# Patient Record
Sex: FEMALE | Race: WHITE | NOT HISPANIC OR LATINO | Employment: STUDENT | URBAN - METROPOLITAN AREA
[De-identification: names, ages, dates, MRNs, and addresses within clinical notes are randomized per-mention and may not be internally consistent; named-entity substitution may affect disease eponyms.]

---

## 2023-06-11 ENCOUNTER — APPOINTMENT (OUTPATIENT)
Dept: ULTRASOUND IMAGING | Facility: CLINIC | Age: 23
End: 2023-06-11
Attending: EMERGENCY MEDICINE
Payer: COMMERCIAL

## 2023-06-11 ENCOUNTER — HOSPITAL ENCOUNTER (EMERGENCY)
Facility: CLINIC | Age: 23
Discharge: HOME | End: 2023-06-12
Attending: EMERGENCY MEDICINE | Admitting: EMERGENCY MEDICINE
Payer: COMMERCIAL

## 2023-06-11 DIAGNOSIS — N83.291 COMPLEX CYST OF RIGHT OVARY: ICD-10-CM

## 2023-06-11 LAB
ALBUMIN SERPL BCG-MCNC: 4.3 G/DL (ref 3.5–5.2)
ALBUMIN UR-MCNC: NEGATIVE MG/DL
ALP SERPL-CCNC: 35 U/L (ref 35–104)
ALT SERPL W P-5'-P-CCNC: 26 U/L (ref 10–35)
ANION GAP SERPL CALCULATED.3IONS-SCNC: 8 MMOL/L (ref 7–15)
APPEARANCE UR: CLEAR
AST SERPL W P-5'-P-CCNC: 57 U/L (ref 10–35)
BASOPHILS # BLD AUTO: 0.1 10E3/UL (ref 0–0.2)
BASOPHILS NFR BLD AUTO: 1 %
BILIRUB SERPL-MCNC: 0.5 MG/DL
BILIRUB UR QL STRIP: NEGATIVE
BUN SERPL-MCNC: 13.5 MG/DL (ref 6–20)
CALCIUM SERPL-MCNC: 9 MG/DL (ref 8.6–10)
CHLORIDE SERPL-SCNC: 101 MMOL/L (ref 98–107)
COLOR UR AUTO: ABNORMAL
CREAT SERPL-MCNC: 0.81 MG/DL (ref 0.51–0.95)
DEPRECATED HCO3 PLAS-SCNC: 26 MMOL/L (ref 22–29)
EOSINOPHIL # BLD AUTO: 0.2 10E3/UL (ref 0–0.7)
EOSINOPHIL NFR BLD AUTO: 2 %
ERYTHROCYTE [DISTWIDTH] IN BLOOD BY AUTOMATED COUNT: 13 % (ref 10–15)
GFR SERPL CREATININE-BSD FRML MDRD: >90 ML/MIN/1.73M2
GLUCOSE SERPL-MCNC: 92 MG/DL (ref 70–99)
GLUCOSE UR STRIP-MCNC: NEGATIVE MG/DL
HCG UR QL: NEGATIVE
HCT VFR BLD AUTO: 36.7 % (ref 35–47)
HGB BLD-MCNC: 12.1 G/DL (ref 11.7–15.7)
HGB UR QL STRIP: NEGATIVE
IMM GRANULOCYTES # BLD: 0 10E3/UL
IMM GRANULOCYTES NFR BLD: 0 %
KETONES UR STRIP-MCNC: NEGATIVE MG/DL
LEUKOCYTE ESTERASE UR QL STRIP: ABNORMAL
LYMPHOCYTES # BLD AUTO: 2.2 10E3/UL (ref 0.8–5.3)
LYMPHOCYTES NFR BLD AUTO: 21 %
MCH RBC QN AUTO: 29.2 PG (ref 26.5–33)
MCHC RBC AUTO-ENTMCNC: 33 G/DL (ref 31.5–36.5)
MCV RBC AUTO: 88 FL (ref 78–100)
MONOCYTES # BLD AUTO: 1 10E3/UL (ref 0–1.3)
MONOCYTES NFR BLD AUTO: 9 %
NEUTROPHILS # BLD AUTO: 7.1 10E3/UL (ref 1.6–8.3)
NEUTROPHILS NFR BLD AUTO: 67 %
NITRATE UR QL: NEGATIVE
NRBC # BLD AUTO: 0 10E3/UL
NRBC BLD AUTO-RTO: 0 /100
PH UR STRIP: 6.5 [PH] (ref 5–7)
PLATELET # BLD AUTO: 268 10E3/UL (ref 150–450)
POTASSIUM SERPL-SCNC: 3.5 MMOL/L (ref 3.4–5.3)
PROT SERPL-MCNC: 7 G/DL (ref 6.4–8.3)
RBC # BLD AUTO: 4.15 10E6/UL (ref 3.8–5.2)
RBC URINE: <1 /HPF
SODIUM SERPL-SCNC: 135 MMOL/L (ref 136–145)
SP GR UR STRIP: 1.01 (ref 1–1.03)
SQUAMOUS EPITHELIAL: 2 /HPF
UROBILINOGEN UR STRIP-MCNC: NORMAL MG/DL
WBC # BLD AUTO: 10.5 10E3/UL (ref 4–11)
WBC URINE: 2 /HPF

## 2023-06-11 PROCEDURE — 80053 COMPREHEN METABOLIC PANEL: CPT | Performed by: EMERGENCY MEDICINE

## 2023-06-11 PROCEDURE — 99284 EMERGENCY DEPT VISIT MOD MDM: CPT | Mod: 25 | Performed by: EMERGENCY MEDICINE

## 2023-06-11 PROCEDURE — 76856 US EXAM PELVIC COMPLETE: CPT

## 2023-06-11 PROCEDURE — 36415 COLL VENOUS BLD VENIPUNCTURE: CPT | Performed by: EMERGENCY MEDICINE

## 2023-06-11 PROCEDURE — 76705 ECHO EXAM OF ABDOMEN: CPT

## 2023-06-11 PROCEDURE — 81003 URINALYSIS AUTO W/O SCOPE: CPT | Performed by: EMERGENCY MEDICINE

## 2023-06-11 PROCEDURE — 85025 COMPLETE CBC W/AUTO DIFF WBC: CPT | Performed by: EMERGENCY MEDICINE

## 2023-06-11 PROCEDURE — 81025 URINE PREGNANCY TEST: CPT | Performed by: EMERGENCY MEDICINE

## 2023-06-11 PROCEDURE — 99284 EMERGENCY DEPT VISIT MOD MDM: CPT | Performed by: EMERGENCY MEDICINE

## 2023-06-11 ASSESSMENT — ACTIVITIES OF DAILY LIVING (ADL): ADLS_ACUITY_SCORE: 35

## 2023-06-12 VITALS
TEMPERATURE: 98.3 F | RESPIRATION RATE: 18 BRPM | BODY MASS INDEX: 25.01 KG/M2 | DIASTOLIC BLOOD PRESSURE: 73 MMHG | HEART RATE: 72 BPM | OXYGEN SATURATION: 97 % | SYSTOLIC BLOOD PRESSURE: 100 MMHG | HEIGHT: 68 IN | WEIGHT: 165 LBS

## 2023-06-12 DIAGNOSIS — R10.2 PELVIC PAIN IN FEMALE: Primary | ICD-10-CM

## 2023-06-12 PROCEDURE — 99203 OFFICE O/P NEW LOW 30 MIN: CPT | Performed by: OBSTETRICS & GYNECOLOGY

## 2023-06-12 ASSESSMENT — ACTIVITIES OF DAILY LIVING (ADL): ADLS_ACUITY_SCORE: 35

## 2023-06-12 NOTE — ED TRIAGE NOTES
Patient states to have right lower abdominal pain that has been going on for the last three days. Today at work it escalated, specifically while standing. States she is not having her period nor having constipation. She states to have taken some ibuprofen that helps a lot. States the pain is coming and going, but there is a constant discomfort.      Triage Assessment     Row Name 06/11/23 0696       Triage Assessment (Adult)    Airway WDL WDL       Respiratory WDL    Respiratory WDL WDL       Skin Circulation/Temperature WDL    Skin Circulation/Temperature WDL WDL       Cardiac WDL    Cardiac WDL WDL       Peripheral/Neurovascular WDL    Peripheral Neurovascular WDL WDL       Cognitive/Neuro/Behavioral WDL    Cognitive/Neuro/Behavioral WDL WDL

## 2023-06-12 NOTE — CONSULTS
"OBGYN CONSULT    HPI: 22 year old G0 presenting to ED tonight with RLQ pain. Has noted pain over past 3 days, worse today while working as a . Pain was constant associated with mild nausea. Better once she sat down. Took ibuprofen which helped. Bowels have been off with constipation and diarrhea. Denies fever or chills. LMP 5/18, is not on contraception and not sexually active. Denies vaginal concerns.     OBGYNHx: Regular, monthly menses, mild cramping.   No history of vaginal infections, never sexually active.     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 Lives with roommates, student at [employer].   Meds: Spironolactone for acne  All: NKDA  ROS: Otherwise negative    O: /73   Pulse 72   Temp 98.3  F (36.8  C) (Oral)   Resp 18   Ht 1.727 m (5' 8\")   Wt 74.8 kg (165 lb)   SpO2 97%   BMI 25.09 kg/m    General: Well appearing, sitting up in bed  Lungs: Non-labored breathing  Heart: Regular rate, well perfused  Abdomen: Soft, mild tenderness to palpation RLQ, no rebound or guarding  Extremities: Non-tender, no edema    CBC RESULTS: Recent Labs   Lab Test 06/11/23  2248   WBC 10.5   RBC 4.15   HGB 12.1   HCT 36.7   MCV 88   MCH 29.2   MCHC 33.0   RDW 13.0        UPT negative  US: Heterogeneous complex 4.8 cm right ovarian cyst. This is favored to represent a hemorrhagic ovarian cyst; however given its appearance, recommend follow-up ultrasound in 8-10 weeks to monitor for resolution. Follow up sooner if symptoms indicate.    A/P: 22 year old G0 with RLQ pain, hemorrhagic appearing cyst on US- no evidence of torsion.  Patient with benign exam and minimal pain currently. Recommend follow up US and visit in 6 weeks weeks. Does not have provider in [facility], will have our clinic reach out to schedule.   Ovarian cysts reviewed in detail, prevention and resolution. Torsion precautions reviewed in detail.  Ibuprofen for " pain  Offered letter for job as - will reach out if she needs.   All questions answered.     Nalini Cross MD

## 2023-06-12 NOTE — ED NOTES
"     Emergency Department Patient Sign-out       Brief HPI:  This is a 22 year old female signed out to me by Dr. Hector .  See initial ED Provider note for details of the presentation.            Significant Events prior to my assuming care: Pt with R sided pelvic pain intermittent. Has R cyst. OB to see for ? torsion      Exam:   Patient Vitals for the past 24 hrs:   BP Temp Temp src Pulse Resp SpO2 Height Weight   06/11/23 2218 110/72 98.2  F (36.8  C) Oral 68 18 100 % 1.727 m (5' 8\") 74.8 kg (165 lb)           ED RESULTS:   Results for orders placed or performed during the hospital encounter of 06/11/23 (from the past 24 hour(s))   UA with Microscopic reflex to Culture     Status: Abnormal    Collection Time: 06/11/23 10:45 PM    Specimen: Urine, Midstream   Result Value Ref Range    Color Urine Straw Colorless, Straw, Light Yellow, Yellow    Appearance Urine Clear Clear    Glucose Urine Negative Negative mg/dL    Bilirubin Urine Negative Negative    Ketones Urine Negative Negative mg/dL    Specific Gravity Urine 1.010 1.003 - 1.035    Blood Urine Negative Negative    pH Urine 6.5 5.0 - 7.0    Protein Albumin Urine Negative Negative mg/dL    Urobilinogen Urine Normal Normal, 2.0 mg/dL    Nitrite Urine Negative Negative    Leukocyte Esterase Urine Trace (A) Negative    RBC Urine <1 <=2 /HPF    WBC Urine 2 <=5 /HPF    Squamous Epithelials Urine 2 (H) <=1 /HPF    Narrative    Urine Culture not indicated   HCG qualitative urine (UPT)     Status: Normal    Collection Time: 06/11/23 10:45 PM   Result Value Ref Range    hCG Urine Qualitative Negative Negative   CBC with platelets differential     Status: None    Collection Time: 06/11/23 10:48 PM    Narrative    The following orders were created for panel order CBC with platelets differential.  Procedure                               Abnormality         Status                     ---------                               -----------         ------                     CBC " with platelets and d...[[id number]]                      Final result                 Please view results for these tests on the individual orders.   Comprehensive metabolic panel     Status: Abnormal    Collection Time: 06/11/23 10:48 PM   Result Value Ref Range    Sodium 135 (L) 136 - 145 mmol/L    Potassium 3.5 3.4 - 5.3 mmol/L    Chloride 101 98 - 107 mmol/L    Carbon Dioxide (CO2) 26 22 - 29 mmol/L    Anion Gap 8 7 - 15 mmol/L    Urea Nitrogen 13.5 6.0 - 20.0 mg/dL    Creatinine 0.81 0.51 - 0.95 mg/dL    Calcium 9.0 8.6 - 10.0 mg/dL    Glucose 92 70 - 99 mg/dL    Alkaline Phosphatase 35 35 - 104 U/L    AST 57 (H) 10 - 35 U/L    ALT 26 10 - 35 U/L    Protein Total 7.0 6.4 - 8.3 g/dL    Albumin 4.3 3.5 - 5.2 g/dL    Bilirubin Total 0.5 <=1.2 mg/dL    GFR Estimate >90 >60 mL/min/1.73m2   CBC with platelets and differential     Status: None    Collection Time: 06/11/23 10:48 PM   Result Value Ref Range    WBC Count 10.5 4.0 - 11.0 10e3/uL    RBC Count 4.15 3.80 - 5.20 10e6/uL    Hemoglobin 12.1 11.7 - 15.7 g/dL    Hematocrit 36.7 35.0 - 47.0 %    MCV 88 78 - 100 fL    MCH 29.2 26.5 - 33.0 pg    MCHC 33.0 31.5 - 36.5 g/dL    RDW 13.0 10.0 - 15.0 %    Platelet Count 268 150 - 450 10e3/uL    % Neutrophils 67 %    % Lymphocytes 21 %    % Monocytes 9 %    % Eosinophils 2 %    % Basophils 1 %    % Immature Granulocytes 0 %    NRBCs per 100 WBC 0 <1 /100    Absolute Neutrophils 7.1 1.6 - 8.3 10e3/uL    Absolute Lymphocytes 2.2 0.8 - 5.3 10e3/uL    Absolute Monocytes 1.0 0.0 - 1.3 10e3/uL    Absolute Eosinophils 0.2 0.0 - 0.7 10e3/uL    Absolute Basophils 0.1 0.0 - 0.2 10e3/uL    Absolute Immature Granulocytes 0.0 <=0.4 10e3/uL    Absolute NRBCs 0.0 10e3/uL   US Appendix Only     Status: None    Collection Time: 06/12/23 12:12 AM    Narrative    EXAM: US APPENDIX ONLY  LOCATION: [facility]  DATE/TIME: 6/12/2023 12:12 AM CDT    INDICATION: RLQ pain  COMPARISON: None.  TECHNIQUE:  Graded compression sonography of the right lower quadrant.    FINDINGS: The appendix is not visualized. No free fluid is visualized.      Impression    IMPRESSION:  1.  The appendix is not visualized. No free fluid is seen. This does not exclude acute appendicitis.       US Pelvis Cmplt w Transvag & Doppler LmtPel Duplex Limited     Status: None    Collection Time: 06/12/23 12:15 AM    Narrative    EXAM: US PELVIS COMPLETE W TRANSVAGINAL AND DOPPLER LIMITED  LOCATION: [facility]  DATE/TIME: 6/12/2023 12:15 AM CDT    INDICATION: Pelvic pain.  COMPARISON: None.  TECHNIQUE: Transabdominal scans were performed. Endovaginal ultrasound was performed to better visualize the adnexa. Color flow with spectral Doppler and waveform analysis performed.    FINDINGS:    UTERUS: 7.4 x 4.7 x 3.8 cm. Normal in size and position with no masses.    ENDOMETRIUM: 7 mm. Normal smooth endometrium.    RIGHT OVARY: 5.7 x 4.5 x 3.7 cm. There is a heterogeneous complex cystic process in the right ovary measuring 4.8 x 4.3 x 2.2 cm. This is favored to most likely represent a hemorrhagic cyst. Flow is demonstrated to the right ovary without evidence for   torsion.    LEFT OVARY: 1.7 x 1.2 x 1.1 cm. Normal with arterial and venous duplex flow identified.    Small to moderate amount of free fluid in the pelvis.      Impression    IMPRESSION:    1.  Heterogeneous complex 4.8 cm right ovarian cyst. This is favored to represent a hemorrhagic ovarian cyst; however given its appearance, recommend follow-up ultrasound in 8-10 weeks to monitor for resolution. Follow up sooner if symptoms indicate.    2.  Left ovary is negative. Both ovaries are negative for torsion.    3.  Uterus is within normal limits.    4.  Small to moderate amount of free fluid in the pelvis.             ED MEDICATIONS:   Medications - No data to display      Impression:  No diagnosis found.    Plan:    Patient was seen by gynecology who  does not suspect torsion.  She appears quite well.  OB will make an appointment for follow-up.  They do not have a suspicion for torsion at this point, likely related to her cyst.  Patient agrees with plan is discharged in good condition..        MD Tommie Christine, Rodger Bergman MD  06/12/23 0158

## 2023-06-12 NOTE — ED PROVIDER NOTES
"    [facility] EMERGENCY DEPARTMENT ([facility])    6/11/23      ED Provider Note  [facility]      History     Chief Complaint   Patient presents with     Abdominal Pain     Patient states to have right lower abdominal pain that has been going on for the last three days. Today at work it escalated, specifically while standing. States she is not having her period nor having constipation. She states to have taken some ibuprofen that helps a lot. States the pain is coming and going, but there is a constant discomfort.      The history is provided by the patient and medical records.     Janelle Dowd is a 22 year old female with no significant past medical history who presents to the ED for evaluation of abdominal pain.  Patient reports having RLQ onset 3 days ago.  Patient notes the pain originally was waxing and waning but worsened today while she was at work.  She reports taking ibuprofen which helped with the pain but she had constant abdominal discomfort.  She endorses nausea and constipation.  Denies fever, vomiting, bloody stools, dysuria, hematuria or any history of abdominal surgery.  Patient notes her last menstrual period was on 05/18/2023.  She is not sexually active.  Denies vaginal discharge or h/o STI.      Past Medical History  No past medical history on file.  No past surgical history on file.  No current outpatient medications on file.    No Known Allergies  Family History  No family history on file.  Social History       Past medical history, past surgical history, medications, allergies, family history, and social history were reviewed with the patient. No additional pertinent items.      A complete review of systems was performed with pertinent positives and negatives noted in the HPI, and all other systems negative.    Physical Exam   BP: 110/72  Pulse: 68  Temp: 98.2  F (36.8  C)  Resp: 18  Height: 172.7 cm (5' 8\")  Weight: 74.8 kg (165 lb)  SpO2: 100 %  Physical " Exam  General: patient is alert and oriented and in no acute distress   Head: atraumatic and normocephalic   EENT: moist mucus membranes   Neck: supple   Cardiovascular: regular rate and rhythm, extremities warm and well perfused, no lower extremity edema  Pulmonary: lungs clear to auscultation bilaterally   Abdomen: soft, right pelvic tenderness to palpation, no rebound or guarding, no CVA tenderness   musculoskeletal: normal range of motion   Neurological: alert and oriented, moving all extremities symmetrically, gait normal   Skin: warm, dry       ED Course, Procedures, & Data      Procedures                   No results found for any visits on 06/11/23.  Medications - No data to display  Labs Ordered and Resulted from Time of ED Arrival to Time of ED Departure - No data to display  No orders to display          Critical care was not performed.     Medical Decision Making  The patient's presentation was of high complexity (an acute health issue posing potential threat to life or bodily function).    The patient's evaluation involved:  ordering and/or review of 3+ test(s) in this encounter (see separate area of note for details)  discussion of management or test interpretation with another health professional (OBGYN)    The patient's management necessitated further care after sign-out to Dr. Reilly (see their note for further management).      Assessment & Plan    Ms. Dowd is a 22 year old female with no significant past medical history who presents to the ED for evaluation of right-sided pelvic pain.  She is hemodynamically stable, afebrile and in no respiratory distress.  Differential diagnosis includes but is not limited to ovarian cyst, ovarian torsion, ectopic pregnancy, PID, appendicitis, ureterolithiasis, UTI.  Labs demonstrate no leukocytosis.  Urine pregnancy is negative.  UA is negative for evidence of UTI, no hematuria.  On pelvic ultrasound she does have a moderate size complex right ovarian cyst.   Normal flow on ultrasound.  Attempted ultrasound of the appendix which was not visualized.  Consulted OB/GYN for concerns of possible intermittent torsion with recommendations pending.  Patient was signed out to Dr. Reilly pending consultation.      I have reviewed the nursing notes. I have reviewed the findings, diagnosis, plan and need for follow up with the patient.    New Prescriptions    No medications on file     I, Gladys Malone, am serving as a trained medical scribe to document services personally performed by Minerva Subramanian MD, based on the provider's statements to me.     I, Minerva Subramanian MD, was physically present and have reviewed and verified the accuracy of this note documented by Gladys Malone.    Minerva Subramanian MD.  [facility] EMERGENCY DEPARTMENT  6/11/2023          Minerva Subramainan MD  06/12/23 0215

## 2023-06-12 NOTE — DISCHARGE INSTRUCTIONS
The gynecology team will call you for an appointment.  If you have any concerns you can call [phone] or return to the emergency department.    If Janelle has discomfort from fever or other pain, she can have:  Acetaminophen (Tylenol) every 6 hours as needed. Her dose is:    2 regular strength tabs (650 mg)                                     (43.2+ kg/96+ lb)    NOTE: If your acetaminophen (Tylenol) came with a dropper marked with 0.4 and 0.8 ml, call us ([phone]) or check with your doctor about the dose before using it.     AND/OR      Ibuprofen (Advil, Motrin) every 6 hours as needed. His/her dose is:    2 regular strength tabs (400 mg)                                                                         (40-60 kg/ lb)

## 2023-07-28 ENCOUNTER — ANCILLARY PROCEDURE (OUTPATIENT)
Dept: ULTRASOUND IMAGING | Facility: CLINIC | Age: 23
End: 2023-07-28
Attending: STUDENT IN AN ORGANIZED HEALTH CARE EDUCATION/TRAINING PROGRAM
Payer: COMMERCIAL

## 2023-07-28 DIAGNOSIS — R10.2 PELVIC PAIN IN FEMALE: ICD-10-CM

## 2023-07-28 PROCEDURE — 76830 TRANSVAGINAL US NON-OB: CPT

## 2023-07-28 PROCEDURE — 76830 TRANSVAGINAL US NON-OB: CPT | Mod: 26 | Performed by: OBSTETRICS & GYNECOLOGY

## 2023-07-31 ENCOUNTER — OFFICE VISIT (OUTPATIENT)
Dept: OBGYN | Facility: CLINIC | Age: 23
End: 2023-07-31
Attending: STUDENT IN AN ORGANIZED HEALTH CARE EDUCATION/TRAINING PROGRAM
Payer: COMMERCIAL

## 2023-07-31 VITALS
BODY MASS INDEX: 25.76 KG/M2 | HEART RATE: 58 BPM | DIASTOLIC BLOOD PRESSURE: 67 MMHG | SYSTOLIC BLOOD PRESSURE: 102 MMHG | WEIGHT: 170 LBS | HEIGHT: 68 IN

## 2023-07-31 DIAGNOSIS — N83.209 HEMORRHAGIC OVARIAN CYST: Primary | ICD-10-CM

## 2023-07-31 PROCEDURE — 99214 OFFICE O/P EST MOD 30 MIN: CPT | Mod: GC | Performed by: STUDENT IN AN ORGANIZED HEALTH CARE EDUCATION/TRAINING PROGRAM

## 2023-07-31 PROCEDURE — G0463 HOSPITAL OUTPT CLINIC VISIT: HCPCS | Performed by: STUDENT IN AN ORGANIZED HEALTH CARE EDUCATION/TRAINING PROGRAM

## 2023-07-31 RX ORDER — SPIRONOLACTONE 100 MG/1
1 TABLET, FILM COATED ORAL
COMMUNITY
Start: 2023-05-11

## 2023-07-31 RX ORDER — CLINDAMYCIN PHOSPHATE 10 UG/ML
LOTION TOPICAL
COMMUNITY
Start: 2023-01-04

## 2023-07-31 RX ORDER — TRIAMCINOLONE ACETONIDE 1 MG/G
CREAM TOPICAL
COMMUNITY
Start: 2023-01-04

## 2023-07-31 ASSESSMENT — ANXIETY QUESTIONNAIRES
1. FEELING NERVOUS, ANXIOUS, OR ON EDGE: SEVERAL DAYS
GAD7 TOTAL SCORE: 1
GAD7 TOTAL SCORE: 1
3. WORRYING TOO MUCH ABOUT DIFFERENT THINGS: NOT AT ALL
2. NOT BEING ABLE TO STOP OR CONTROL WORRYING: NOT AT ALL
5. BEING SO RESTLESS THAT IT IS HARD TO SIT STILL: NOT AT ALL
6. BECOMING EASILY ANNOYED OR IRRITABLE: NOT AT ALL
7. FEELING AFRAID AS IF SOMETHING AWFUL MIGHT HAPPEN: NOT AT ALL
IF YOU CHECKED OFF ANY PROBLEMS ON THIS QUESTIONNAIRE, HOW DIFFICULT HAVE THESE PROBLEMS MADE IT FOR YOU TO DO YOUR WORK, TAKE CARE OF THINGS AT HOME, OR GET ALONG WITH OTHER PEOPLE: NOT DIFFICULT AT ALL

## 2023-07-31 ASSESSMENT — PATIENT HEALTH QUESTIONNAIRE - PHQ9
SUM OF ALL RESPONSES TO PHQ QUESTIONS 1-9: 0
5. POOR APPETITE OR OVEREATING: NOT AT ALL

## 2023-07-31 ASSESSMENT — PAIN SCALES - GENERAL: PAINLEVEL: NO PAIN (0)

## 2023-07-31 NOTE — PROGRESS NOTES
"[facility]    HPI: Janelle Dowd is a 22 year old G0 who presents to clinic today to discuss hemorrhagic cyst found on US.. She was seen in the ED on 23 for RLQ pain x3 days and found to have a complex 4.8cm right ovarian cyst favored to be a hemorrhagic cyst. Patient reports pain has resolved. She reports she has had similar but less intense pain in the past. She reports LMP was , this period came earlier and is lasting longer than her previous.    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 Moderate bleeding (4-5 pads/day on heaviest days). Cramps controlled with ibuprofen  STI Hx: None  Contraception: none  Sexual activity: never  Pap smear history: Not yet done    Labs:   Hgb 23: 12.1    Imaging:   Images personally reviewed     Pelvic US 23  Gynecological Ultrasonography:   Uterus: anteverted. Contour is smooth/regular.  Size: 7.5 x 4.5 x 3.5 cm  Endometrium: Thickness Total 2.7 mm     Findings:   Right Ovary: 3.4 x 2.4 x 2.2 cm. Wnl  Left Ovary: 3.0 x 1.9 x 1.8 cm. Wnl  Cul de Sac Free Fluid: No free fluid     Impression: Interval resolution of right ovarian cyst.    PMH, PSH, Family history, Social history, medications and allergies were reviewed and updated in EMR.     Objective:   /67   Pulse 58   Ht 1.73 m (5' 8.11\")   Wt 77.1 kg (170 lb)   LMP 2023 (Exact Date)   BMI 25.76 kg/m    General: Healthy appearing. Alert, oriented. Affect is appropriate.   HEENT: Eyes are normal with clear sclerae. Ears are symmetric.   Heart: Regular rate    Lungs: Breathing is unlabored.     Assessment/Plan:   Janelle Dowd is a 22 year old  female with history of hemorrhagic cysts on  ultrasound that was found to have resolved on repeat ultrasound on . Patient reports she is doing well and  has no symptoms. Discussed with " patient the options of birth control as a means to control menstrual bleeding and cramping and to prevent ovarian cysts by preventing ovulation. Also discussed the teratogenicity of spironolactone on pregnancy and the importance of preventing pregnancy while on the medication and she expressed understanding. Patient reports she is not sexually active but will consider birth control if she has recurrent bothersome ovarian cysts. Finally, dicussed with the patient that she is due for a pap smear and she can schedule an annual/physical with the clinic or her primary care.     #Hemorrhagic ovarian cysts  -Resolved    # cervical cancer screening  -Due for pap, patient to follow up    #Spironolactone teratogenicity   -Counseled patient on birth control and preventing future pregnancy while on medication     Alesha Oconnor MD MPH  Obstetric & Gynecology, PGY-1  July 31, 2023 , 8:24 AM       I examined Janelle Dowd on 7/31/2023 with Dr. Oconnor and agree with the presentation, exam and plan of care documented in this note with edits by me.   Claudia Harden MD

## 2023-07-31 NOTE — LETTER
"7/31/2023       RE: Janelle Dowd  [address]  [place] [address]     Dear Colleague,    Thank you for referring your patient, Janelle Dowd, to the [facility]. Please see a copy of my visit note below.    [facility]    HPI: Janelle Dowd is a 22 year old G0 who presents to clinic today to discuss hemorrhagic cyst found on US.. She was seen in the ED on 6/12/23 for RLQ pain x3 days and found to have a complex 4.8cm right ovarian cyst favored to be a hemorrhagic cyst. Patient reports pain has resolved. She reports she has had similar but less intense pain in the past. She reports LMP was 7/24, this period came earlier and is lasting longer than her previous.    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 Moderate bleeding (4-5 pads/day on heaviest days). Cramps controlled with ibuprofen  STI Hx: None  Contraception: none  Sexual activity: never  Pap smear history: Not yet done    Labs:   Hgb 6/11/23: 12.1    Imaging:   Images personally reviewed     Pelvic US 7/28/23  Gynecological Ultrasonography:   Uterus: anteverted. Contour is smooth/regular.  Size: 7.5 x 4.5 x 3.5 cm  Endometrium: Thickness Total 2.7 mm     Findings:   Right Ovary: 3.4 x 2.4 x 2.2 cm. Wnl  Left Ovary: 3.0 x 1.9 x 1.8 cm. Wnl  Cul de Sac Free Fluid: No free fluid     Impression: Interval resolution of right ovarian cyst.    PMH, PSH, Family history, Social history, medications and allergies were reviewed and updated in EMR.     Objective:   /67   Pulse 58   Ht 1.73 m (5' 8.11\")   Wt 77.1 kg (170 lb)   LMP 07/24/2023 (Exact Date)   BMI 25.76 kg/m    General: Healthy appearing. Alert, oriented. Affect is appropriate.   HEENT: Eyes are normal with clear sclerae. Ears are " symmetric.   Heart: Regular rate    Lungs: Breathing is unlabored.     Assessment/Plan:   Janelle Dowd is a 22 year old  female with history of hemorrhagic cysts on  ultrasound that was found to have resolved on repeat ultrasound on . Patient reports she is doing well and  has no symptoms. Discussed with patient the options of birth control as a means to control menstrual bleeding and cramping and to prevent ovarian cysts by preventing ovulation. Also discussed the teratogenicity of spironolactone on pregnancy and the importance of preventing pregnancy while on the medication and she expressed understanding. Patient reports she is not sexually active but will consider birth control if she has recurrent bothersome ovarian cysts. Finally, dicussed with the patient that she is due for a pap smear and she can schedule an annual/physical with the clinic or her primary care.     #Hemorrhagic ovarian cysts  -Resolved    # cervical cancer screening  -Due for pap, patient to follow up    #Spironolactone teratogenicity   -Counseled patient on birth control and preventing future pregnancy while on medication     Alesha Oconnor MD MPH  Obstetric & Gynecology, PGY-1  2023 , 8:24 AM       I examined Janelle Dowd on 2023 with Dr. Oconnor and agree with the presentation, exam and plan of care documented in this note with edits by me.   Claudia Harden MD             Again, thank you for allowing me to participate in the care of your patient.      Sincerely,    Claudia Harden MD

## 2023-07-31 NOTE — PATIENT INSTRUCTIONS
Thank you for trusting us with your care!     If you need to contact us for questions about:  Symptoms, Scheduling & Medical Questions; Non-urgent (2-3 day response) Solitario message, Urgent (needing response today) [phone] (if after 3:30pm next day response)   Prescriptions: Please call your Pharmacy   Billing: [place] [phone] or [facility]:[phone]

## 2023-08-13 ENCOUNTER — HEALTH MAINTENANCE LETTER (OUTPATIENT)
Age: 23
End: 2023-08-13

## 2024-07-19 ENCOUNTER — LAB REQUISITION (OUTPATIENT)
Dept: LAB | Facility: CLINIC | Age: 24
End: 2024-07-19
Payer: COMMERCIAL

## 2024-07-19 DIAGNOSIS — L70.9 ACNE, UNSPECIFIED: ICD-10-CM

## 2024-07-19 DIAGNOSIS — Z00.00 ENCOUNTER FOR GENERAL ADULT MEDICAL EXAMINATION WITHOUT ABNORMAL FINDINGS: ICD-10-CM

## 2024-07-19 PROCEDURE — 80061 LIPID PANEL: CPT | Mod: ORL | Performed by: FAMILY MEDICINE

## 2024-07-19 PROCEDURE — 84132 ASSAY OF SERUM POTASSIUM: CPT | Mod: ORL | Performed by: FAMILY MEDICINE

## 2024-07-20 LAB
CHOLEST SERPL-MCNC: 180 MG/DL
FASTING STATUS PATIENT QL REPORTED: NORMAL
HDLC SERPL-MCNC: 57 MG/DL
LDLC SERPL CALC-MCNC: 99 MG/DL
NONHDLC SERPL-MCNC: 123 MG/DL
POTASSIUM SERPL-SCNC: 4 MMOL/L (ref 3.4–5.3)
TRIGL SERPL-MCNC: 122 MG/DL

## 2024-10-06 ENCOUNTER — HEALTH MAINTENANCE LETTER (OUTPATIENT)
Age: 24
End: 2024-10-06

## 2025-02-06 ENCOUNTER — LAB REQUISITION (OUTPATIENT)
Dept: LAB | Facility: CLINIC | Age: 25
End: 2025-02-06
Payer: COMMERCIAL

## 2025-02-06 DIAGNOSIS — N92.6 IRREGULAR MENSTRUATION, UNSPECIFIED: ICD-10-CM

## 2025-02-06 LAB
ESTRADIOL SERPL-MCNC: 106 PG/ML
FSH SERPL IRP2-ACNC: 5 MIU/ML
HCG INTACT+B SERPL-ACNC: <1 MIU/ML
PROLACTIN SERPL 3RD IS-MCNC: 14 NG/ML (ref 5–23)
TSH SERPL DL<=0.005 MIU/L-ACNC: 2.07 UIU/ML (ref 0.3–4.2)

## 2025-02-06 PROCEDURE — 84443 ASSAY THYROID STIM HORMONE: CPT | Mod: ORL | Performed by: FAMILY MEDICINE

## 2025-02-06 PROCEDURE — 82670 ASSAY OF TOTAL ESTRADIOL: CPT | Mod: ORL | Performed by: FAMILY MEDICINE

## 2025-02-06 PROCEDURE — 83001 ASSAY OF GONADOTROPIN (FSH): CPT | Mod: ORL | Performed by: FAMILY MEDICINE

## 2025-02-06 PROCEDURE — 84146 ASSAY OF PROLACTIN: CPT | Mod: ORL | Performed by: FAMILY MEDICINE

## 2025-02-06 PROCEDURE — 84702 CHORIONIC GONADOTROPIN TEST: CPT | Mod: ORL | Performed by: FAMILY MEDICINE
